# Patient Record
Sex: FEMALE | Race: WHITE | URBAN - METROPOLITAN AREA
[De-identification: names, ages, dates, MRNs, and addresses within clinical notes are randomized per-mention and may not be internally consistent; named-entity substitution may affect disease eponyms.]

---

## 2023-05-09 ENCOUNTER — OFFICE VISIT (OUTPATIENT)
Dept: FAMILY MEDICINE CLINIC | Facility: CLINIC | Age: 33
End: 2023-05-09
Payer: COMMERCIAL

## 2023-05-09 VITALS
RESPIRATION RATE: 16 BRPM | SYSTOLIC BLOOD PRESSURE: 128 MMHG | HEART RATE: 86 BPM | BODY MASS INDEX: 25.3 KG/M2 | OXYGEN SATURATION: 98 % | HEIGHT: 63 IN | WEIGHT: 142.81 LBS | DIASTOLIC BLOOD PRESSURE: 68 MMHG

## 2023-05-09 DIAGNOSIS — Z00.00 WELL WOMAN EXAM WITHOUT GYNECOLOGICAL EXAM: Primary | ICD-10-CM

## 2023-05-09 PROCEDURE — 3008F BODY MASS INDEX DOCD: CPT | Performed by: FAMILY MEDICINE

## 2023-05-09 PROCEDURE — 3078F DIAST BP <80 MM HG: CPT | Performed by: FAMILY MEDICINE

## 2023-05-09 PROCEDURE — 99385 PREV VISIT NEW AGE 18-39: CPT | Performed by: FAMILY MEDICINE

## 2023-05-09 PROCEDURE — 3074F SYST BP LT 130 MM HG: CPT | Performed by: FAMILY MEDICINE

## 2023-09-21 LAB
AMB EXT CHOL/HDL RATIO: 2.6
AMB EXT CHOLESTEROL, TOTAL: 142 MG/DL
AMB EXT GLUCOSE: 86 MG/DL
AMB EXT HDL CHOLESTEROL: 55 MG/DL
AMB EXT HEMATOCRIT: 41.1
AMB EXT HEMOGLOBIN: 13.4
AMB EXT LDL CHOLESTEROL, DIRECT: 74 MG/DL
AMB EXT MCV: 93
AMB EXT PLATELETS: 235
AMB EXT TRIGLYCERIDES: 62 MG/DL
AMB EXT WBC: 7.1 X10(3)UL

## 2024-04-22 ENCOUNTER — TELEPHONE (OUTPATIENT)
Dept: FAMILY MEDICINE CLINIC | Facility: CLINIC | Age: 34
End: 2024-04-22

## 2024-04-22 DIAGNOSIS — Z00.00 ROUTINE HEALTH MAINTENANCE: Primary | ICD-10-CM

## 2024-04-22 NOTE — TELEPHONE ENCOUNTER
Please enter lab orders for the patient's upcoming physical appointment.     Physical scheduled:   Your appointments       Date & Time Appointment Department ([place])    May 14, 2024 3:00 PM CDT Physical - Established with Gisell Harrington DO [facility], [facility] ([facility])              [facility]  [address] [place] [address] [place] [id number]  [phone]           Preferred lab: [facility] ([facility])     The patient has been notified to complete fasting labs prior to their physical appointment.

## 2024-07-05 ENCOUNTER — OFFICE VISIT (OUTPATIENT)
Dept: FAMILY MEDICINE CLINIC | Facility: CLINIC | Age: 34
End: 2024-07-05
Payer: COMMERCIAL

## 2024-07-05 VITALS
OXYGEN SATURATION: 100 % | HEIGHT: 62.7 IN | DIASTOLIC BLOOD PRESSURE: 74 MMHG | TEMPERATURE: 98 F | SYSTOLIC BLOOD PRESSURE: 112 MMHG | BODY MASS INDEX: 26.74 KG/M2 | RESPIRATION RATE: 16 BRPM | WEIGHT: 149 LBS | HEART RATE: 70 BPM

## 2024-07-05 DIAGNOSIS — Z00.00 WELL WOMAN EXAM WITHOUT GYNECOLOGICAL EXAM: Primary | ICD-10-CM

## 2024-07-05 DIAGNOSIS — H91.93 HEARING DIFFICULTY OF BOTH EARS: ICD-10-CM

## 2024-07-05 NOTE — PROGRESS NOTES
SUBJECTIVE:  Chief Complaint   Patient presents with    Physical     WWE no pap    Hearing Problem     X3-4 years     HPI:  Hearing problem: Sleeps with ear plugs every night which helps her fall asleep. Notes that she reads lips when they are talking so struggled during covid with everyone wearing masks. Now noticing that she is mis-hearing a lot. Unsure if this is related to focusing as well.     Stopped IUD in March. May want to conceive in the near future.     Health Maintenance:  Vaccines: reviewed as below. Indicated today: none (Had Tdap in [facility] 2022)   Obesity screening: Body mass index is 26.65 kg/m².  Diabetes screening:  due  Hypercholesterolemia screening: due  Depression screen:   Depression Screening (PHQ-2/PHQ-9): Over the LAST 2 WEEKS   Little interest or pleasure in doing things: Not at all  Feeling down, depressed, or hopeless: Not at all  PHQ-2 SCORE: 0    Osteoporosis: No history of pathologic fractures. No steroid use, smoking, risk of falls, excessive alcohol use.  Cervical Cancer screening: Last Pap: 3/2023    Colon Cancer screening: Family history of colon cancer? no   Breast Cancer screening: Family history of breast cancer? Yes- maternal side- grandmother  STI: Desires testing for STIs? no  Tobacco use:  reports that she has never smoked. She has never used smokeless tobacco.    ROS: Negative unless stated above    HISTORY:  History reviewed. No pertinent past medical history.   History reviewed. No pertinent surgical history.   Family History   Problem Relation Age of Onset    High Blood Pressure Mother     Asthma Father     Breast Cancer Maternal Grandmother       Social History     Socioeconomic History    Marital status:    Occupational History    Occupation: Teacher   Tobacco Use    Smoking status: Never    Smokeless tobacco: Never   Vaping Use    Vaping status: Never Used   Substance and Sexual Activity    Alcohol use: Yes     Comment: 2 drinks a week    Drug use: Never     Sexual activity: Yes     Partners: Male   Other Topics Concern    Caffeine Concern Yes     Comment: 1 cup of coffee daily    Exercise Yes     Comment: 3x a week    Self-Exams Yes        Allergies:  Allergies   Allergen Reactions    Guaifenesin SWELLING    Phenylephrine SWELLING    Sudafed ITCHING, RASH and SWELLING       OBJECTIVE:  PHYSICAL EXAM:  Vitals:    07/05/24 0845   BP: 112/74   Pulse: 70   Resp: 16   Temp: 97.9 °F (36.6 °C)   SpO2: 100%   Weight: 149 lb (67.6 kg)   Height: 5' 2.7\" (1.593 m)     Physical Examination: General appearance - alert, well appearing, and in no distress and normal appearing weight  Mental status - alert, oriented to person, place, and time, normal mood, behavior, speech, dress, motor activity, and thought processes  Ears - bilateral TM's and external ear canals normal  Mouth - mucous membranes moist, pharynx normal without lesions  Neck - supple, no significant adenopathy  Chest - clear to auscultation, no wheezes, rales or rhonchi, symmetric air entry  Heart - normal rate, regular rhythm, normal S1, S2, no murmurs, rubs, clicks or gallops  Abdomen - soft, nontender, nondistended, no masses or organomegaly  Extremities - peripheral pulses normal, no pedal edema, no clubbing or cyanosis    ASSESSMENT & PLAN:  Camila Stratton is a 34 year old female is here for Physical (WWE no pap) and Hearing Problem (X3-4 years)    Problem List Items Addressed This Visit    None  Visit Diagnoses       Well woman exam without gynecological exam    -  Primary    Hearing difficulty of both ears        Relevant Orders    ENT Referral - In Network          Camila was seen today for physical and hearing problem.    Diagnoses and all orders for this visit:    Well woman exam without gynecological exam  Routine health maintenance reviewed, discussed and updated as below. This includes: labs. Healthy diet and exercise reviewed.     Hearing difficulty of both ears  -   Normal ear exam today. Recommend formal  hearing testing and exam.  ENT Referral - In Network    Call or return to clinic prn if these symptoms worsen or fail to improve as anticipated. RTC in 1 year.   Gisell Harrington DO  7/5/2024 9:08 AM    Note to Patient  The 21st Century Cures Act makes medical notes like these available to patients in the interest of transparency. However, be advised this is a medical document and is intended as hysa-tb-dpsk communication; it is written in medical language and may appear blunt, direct, or contain abbreviations or verbiage that are unfamiliar. Medical documents are intended to carry relevant information, facts as evident, and the clinical opinion of the practitioner.     This report has been produced using speech recognition software, and may contain errors related to grammar, punctuation, spelling, words or phrases unrecognized or not translated appropriately to text; these errors may be referred to the dictating provider for further clarification and/or addendum as needed.

## 2025-03-24 ENCOUNTER — PATIENT MESSAGE (OUTPATIENT)
Dept: FAMILY MEDICINE CLINIC | Facility: CLINIC | Age: 35
End: 2025-03-24

## 2025-03-24 DIAGNOSIS — Z00.00 ROUTINE HEALTH MAINTENANCE: Primary | ICD-10-CM

## 2025-03-31 DIAGNOSIS — E04.9 ENLARGED THYROID: Primary | ICD-10-CM

## 2025-04-03 NOTE — TELEPHONE ENCOUNTER
Spoke with Camila giving her 's comments and recommendations. Camila has an appointment with  4/8/2025. She will have her labs done tomorrow. Regarding weight loss, she lost 14lbs without trying from December to March. She has diarrhea all the time. She feels hot and sweaty all the time. All these symptoms have been present the past 6 months. For the past 3 months her legs feel shaky and unsteady going down stairs and her R leg feels shaky when extended driving.     For your review  routed to

## 2025-04-03 NOTE — TELEPHONE ENCOUNTER
Spoke with Camila who was dx with Hypothyroidism by Gyne this morning. They are trying to conceive. She was given a referral to  and has an appointment 6/02/2025. She is asking what is the next step. She has an annual physical scheduled with  5/29/2025. Labs are ordered and Thyroid testing is included.  I told her we are asking labs be done no more than 30 days before the appointment. Camila verbalized understanding. Mine is asking if a video visit could be done sooner regarding her new diagnosis or could she wait to discuss at her 5/29 appointment with     Please advise   routed to

## 2025-04-03 NOTE — TELEPHONE ENCOUNTER
GYN labs reviewed and consistent with hyperthyroidism which is concerning for an acute disease like Graves' disease.  We would not want this to wait.  I am going to put in a few additional labs for her to obtain and let's get her an appointment ASAP.  Lab orders are in.  Can you also ask if she is having any palpitations or racing heart, weight loss, vision changes, diarrhea, anxiety?    Gisell Harrington, DO

## 2025-04-03 NOTE — TELEPHONE ENCOUNTER
Pts calling because her OBGYN diagnosed her with hypothyroidism and she wants to know what her next steps should be

## 2025-04-08 NOTE — PROGRESS NOTES
The following individual(s) verbally consented to be recorded using ambient AI listening technology and understand that they can each withdraw their consent to this listening technology at any point by asking the clinician to turn off or pause the recording:    Patient name: Camila Dwyerrio

## 2025-04-08 NOTE — TELEPHONE ENCOUNTER
would like Camila to see Endocrinology as soon as possible due to possible Graves Disease. A Referral was placed to . Spoke with Demetrio at 's office, telling her Camila is not on any medication to manage her Thyroid symptoms. She will call Camila to try to coordinate an appointment. Notified  that 's office will contact Camila to make an appointment.

## 2025-04-08 NOTE — PROGRESS NOTES
Chief Complaint:  Chief Complaint   Patient presents with    Lab Results     Thyroid testing 4/4/2025 and blood testing.       HPI:  This is a 34 year old female patient presenting for Lab Results (Thyroid testing 4/4/2025 and blood testing.)    History of Present Illness  Camila Wiley is a 34 year old female with hyperthyroidism who presents with symptoms of elevated heart rate and tremors.    She has been experiencing symptoms consistent with hyperthyroidism for several months, with some symptoms becoming more noticeable in the last three months. She has an elevated heart rate, which she monitors using a watch that notifies her when her heart rate exceeds 100 bpm. Initialylwas seen by OBGYN and noted to have abnoraml thyroid labs. These were reviewed by me and indicated hyperthyroidism. We discussed over the phone and I sent in atenolol 25mg once daily and recommended updating labs. Since starting atenolol, her heart rate is generally in the 85-90 bpm range during the day but increases in the afternoon and evening, reaching over 100 bpm while at rest.    She experiences tremors, persistent diarrhea, and weight loss. She has been taking atenolol, initially once daily, which has helped maintain her heart rate during the day but not in the evening. There has been no improvement in tremors or weakness with the medication. She has persistent diarrhea and weight loss, which have not improved.    Laboratory tests showed TSH levels below normal, elevated free T4 at 4.1, total T3 at 3.39, thyroid stimulating immunoglobulin at 6.24, antithyroglobulin at 1066, and antithyroproxase at 4014. These findings suggest Graves' disease, although there is also a presence of antibodies typically associated with Hashimoto's disease.    She has been off hormonal birth control for a year and questions whether this is related to her current symptoms, but it is not believed to be connected. She is planning to become pregnant and is  concerned about the implications of her condition on pregnancy.    No eye-related issues.    Health Maintenance:  Health Maintenance   Topic Date Due    DTaP,Tdap,and Td Vaccines (1 - Tdap) Never done    COVID-19 Vaccine (1 - 2024-25 season) Never done    Annual Depression Screening  01/01/2025    Annual Physical  07/05/2025    Influenza Vaccine (Season Ended) 10/01/2025    Pap Smear  03/28/2026    Pneumococcal Vaccine: Birth to 50yrs  Aged Out    Meningococcal B Vaccine  Aged Out       ROS: Review of systems performed and negative unless stated in HPI.    Past medical, family and social history reviewed and listed as below.    HISTORY:  No past medical history on file.   No past surgical history on file.   Family History   Problem Relation Age of Onset    High Blood Pressure Mother     Asthma Father     Breast Cancer Maternal Grandmother       Social History     Socioeconomic History    Marital status:    Occupational History    Occupation: Teacher   Tobacco Use    Smoking status: Never    Smokeless tobacco: Never   Vaping Use    Vaping status: Never Used   Substance and Sexual Activity    Alcohol use: Yes     Comment: 2 drinks a week    Drug use: Never    Sexual activity: Yes     Partners: Male   Other Topics Concern    Caffeine Concern Yes     Comment: 1 cup of coffee daily    Exercise Yes     Comment: 3x a week    Self-Exams Yes        Current Outpatient Medications   Medication Sig Dispense Refill    atenolol 25 MG Oral Tab Take 1 tablet (25 mg total) by mouth 2 (two) times daily. 60 tablet 0     Allergies:  Allergies[1]    EXAM:  Vitals:    04/08/25 1213   BP: 124/64   BP Location: Left arm   Pulse: 76   Resp: 16   Temp: 97.4 °F (36.3 °C)   TempSrc: Oral   SpO2: 98%   Weight: 140 lb 6.4 oz (63.7 kg)     GENERAL: vitals reviewed and listed above, alert, oriented, appears well hydrated and in no acute distress  HEENT: atraumatic, conjunctiva clear, no obvious abnormalities on inspection   NECK: goiter  present without tenderness  LUNGS: clear to auscultation bilaterally, no wheezes, rales or rhonchi, good air movement  CV: HRRR, no murmurs, no peripheral edema   EXTREMITIES: warm and well perfused  PSYCH: pleasant and cooperative, no obvious depression or anxiety    ASSESSMENT AND PLAN:  Discussed the following assessment   1. Hyperthyroidism  -     Atenolol; Take 1 tablet (25 mg total) by mouth 2 (two) times daily.  Dispense: 60 tablet; Refill: 0      Advised the following:  Assessment & Plan  Graves' Disease  She presents with symptoms of overt hyperthyroidism, including elevated heart rate, tremors, and weight loss. Lab results confirm Graves' disease with elevated thyroid stimulating immunoglobulin, free T4, and total T3. Positive antithyroglobulin and antithyroperoxidase antibodies suggest possible concurrent autoimmune thyroiditis. Her plans for pregnancy influence treatment options due to potential risks associated with therapies like radioactive iodine.  - Increase atenolol to twice daily to manage tachycardia and tremors.  - Refer to endocrinology for expedited management and treatment planning, considering her pregnancy plans.  - Discuss with endocrinology the implications of methimazole or PTU versus radioactive iodine on future pregnancy.    Tachycardia  She experiences elevated heart rate, particularly in the evening, with current atenolol regimen. Resting heart rate exceeds 100 bpm, indicating inadequate control.  - Adjust atenolol dosing to twice daily for consistent heart rate control.  - Consider switching to propranolol if atenolol adjustment is insufficient.    Follow-up  Prompt follow-up with endocrinology is necessary for managing Graves' disease and its implications for pregnancy. Coordination with her gynecologist is essential for comprehensive care.  - Coordinate with endocrinology for an appointment   - Ensure communication between her gynecologist and endocrinologist for coordinated care  regarding pregnancy planning.      Gisell Harrington DO  4/8/2025 1:14 PM  Family Medicine    Note to Patient  The 21st Century Cures Act makes medical notes like these available to patients in the interest of transparency. However, be advised this is a medical document and is intended as ctay-se-ccod communication; it is written in medical language and may appear blunt, direct, or contain abbreviations or verbiage that are unfamiliar. Medical documents are intended to carry relevant information, facts as evident, and the clinical opinion of the practitioner.     This report has been produced using speech recognition software, and may contain errors related to grammar, punctuation, spelling, words or phrases unrecognized or not translated appropriately to text; these errors may be referred to the dictating provider for further clarification and/or addendum as needed.         [1]   Allergies  Allergen Reactions    Guaifenesin SWELLING    Phenylephrine SWELLING    Sudafed ITCHING, RASH and SWELLING

## 2025-04-18 NOTE — PATIENT INSTRUCTIONS
Visit Summary  Stop atenolol and start Propranolol 10 mg three times a day  Start Methimazole 20 mg daily  Repeat labs in 4 weeks or sooner if symptomatic  I will be in touch at that time     General follow up information:  Please let us know if you require any refills at least 1 week prior to your medication running out. If you do run out of medication, please call our office ASAP to request refills (do not wait until your follow up).  Please call us if you experience any problems with insurance coverage of medication, lab work, or imaging.   Lab results and imaging will typically be reviewed at follow up appointments, or within 3-5 business days of ALL results being in if you do not have an appointment scheduled in the near future. Our office will contact you for any abnormal results requiring more urgent follow up or action.  The on-call pager is for urgent matters only. If you are a type 1 diabetic and run out of insulin after business hours 8AM-4PM, you may call the on-call pager for a refill to a 24 hour pharmacy. If you have adrenal insufficiency and run out of steroids, you may call the on-call pager for a refill to a 24 hours pharmacy. All other refill requests should be requested during business hours.    Return Visit   [x] Dr. Carlin in 3 months   [x] Virtual visit  [] No follow up appointment needed  [] Follow up to be scheduled pending      []  Fasting/8AM labs  []  Central scheduling # for ultrasound/nuclear med/CT/MRI/DXA/IR  []  Provide flyer for:  [] ENT   [] Weight Management  [] Infertility/Reproductive Endocrinology  [] Transgender care  []  Directions to 1st floor lab  [] Collect urine collection jug  [] Collect salivary cortisol tubes  []  Dental clearance form  []  Will need authorization for outside records

## 2025-04-18 NOTE — H&P
[facility] [place]    Name: Camila Wiley    Date: 4/18/2025    Camila Wiley is a 34 year old female who presents for evaluation of thyrotoxicosis.     Chief complaint: Ultrasound (Thyroid ultrasound 4/2025) and Graves (Establish care, treatment plan )       Subjective:    Initial HPI consult - 4/18/2025  She had her annual gyene appointment around 2 weeks ago and was told she has an enlarged thyroid. She underwent blood work and thyroid US. She was told she has hyperthyroidism, she followed with her PCP who ordered antibodies and was told she has graves disease.  She started atenolol around 2 weeks ago. Initially daily and now BID, around 1 week ago.  She does note a hx of goiter as a child and has had annual work up with unremarkable TFTs until recently.  Stopped hormonal birth control around 1 year ago.     Thyroid Hx:   -Family history- denies    [] History of head/neck radiation   [] Recent illness or contrast exposure  [] Biotin Use  [] Amiodarone Use  [] Tobacco Use Hx      She endorses excessive sweating, light menses, fatigue, irritibaility, trouble slepeing, loose stools, tachycardia/pallpitations, weight loss over the last 3-6 months       History/Other:    Allergies, PMH, SocHx and FHx reviewed and updated as appropriate in Epic on Current Medications[1]  Allergies[2]  Current Medications[3]  Past Medical History[4]  Family History[5]  Social history: Reviewed.      ROS/Exam    REVIEW OF SYSTEMS: Ten point review of systems has been performed and is otherwise negative and/or non-contributory, except as described above.     VITALS  Vitals:    04/18/25 1101   BP: 114/78   Pulse: 74   SpO2: 97%   Weight: 137 lb (62.1 kg)   Height: 5' 2\" (1.575 m)       Wt Readings from Last 6 Encounters:   04/18/25 137 lb (62.1 kg)   04/08/25 140 lb 6.4 oz (63.7 kg)   07/05/24 149 lb (67.6 kg)   05/09/23 142 lb 12.8 oz (64.8 kg)       PHYSICAL EXAM  CONSTITUTIONAL:  awake, alert, cooperative, no  apparent distress, and appears stated age    PSYCH: normal affect  LUNGS: breathing comfortably  CARDIOVASCULAR:  regular rate   NECK: thyromegaly    Labs/Imaging: Pertinent imaging reviewed.    Assessment & Plan:     ICD-10-CM    1. Graves disease  E05.00 Thyrotropin Receptor Ab, Serum     TSH and Free T4 [E]     Triiodothyronine (T3) Total     methIMAzole 10 MG Oral Tab     DISCONTINUED: propranolol 10 MG Oral Tab          Camila Wiley is a pleasant 34 year old female here for evaluation of:    #Hyperthyroidism  Lab Results   Component Value Date    TSH <0.010 (L) 04/04/2025    T4F 4.1 (H) 04/04/2025      PMHx of Hyperthyroidism diagnosed recently with positive ab c/w Graves disease. Started on atenolol BID.    - Pathophysiology of condition, goals of therapy,  d/w pt in detail.   - clinical significance of thyroid and antibody testing discussed   -Patient endorses symptoms of hyperthyroidism   -Recent TFTs with TSH suppressed and FT4 above range   -4/2025 Thyroid US without any discrete nodules     - thyroid is non-nodular on exam  and no opthalmopathy on exam (would need to refer to ophtho and also CI for RAIA)  - pt is not pregnant, breastfeeding or planning for pregnancy in the near future  - uli start on MMI 20mg daily  - counseled on side effects, including but not limited to rash and very rarely agranulocytosis. Counseled to call clinic so we may order CBC     Plan:  - med changes: start MMI 10 mg BID and Propranolol 10 mg TID   - lab orders placed for 4 weeks and will discuss next steps once all labs result      Return in about 3 months (around 7/18/2025).    The above plan was discussed in detail with the patient who verbalized understanding and agreement.     Neda Carlin DO  [facility]  4/18/2025     In reviewing this note, please be advised that Dragon Voice Recognition software used to dictate the note may have made errors in recognizing some of the words or phrases.     Note to  patient: The 21 Century Cures Act makes medical notes like these available to patients in the interest of transparency. However, be advised this is a medical document. It is intended as peer to peer communication. It is written in medical language and may contain abbreviations or verbiage that are unfamiliar. It may appear blunt or direct. Medical documents are intended to carry relevant information, facts as evident, and the clinical opinion of the practitioner.            [1]    [DISCONTINUED] propranolol 10 MG Oral Tab Take 1 tablet (10 mg total) by mouth 3 (three) times daily. 180 tablet 0    methIMAzole 10 MG Oral Tab Take 1 tablet (10 mg total) by mouth in the morning and 1 tablet (10 mg total) before bedtime. 180 tablet 0   [2]   Allergies  Allergen Reactions    Guaifenesin SWELLING    Phenylephrine SWELLING    Sudafed ITCHING, RASH and SWELLING   [3]   Current Outpatient Medications   Medication Sig Dispense Refill    methIMAzole 10 MG Oral Tab Take 1 tablet (10 mg total) by mouth in the morning and 1 tablet (10 mg total) before bedtime. 180 tablet 0    propranolol 10 MG Oral Tab Take 1 tablet (10 mg total) by mouth 3 (three) times daily. 270 tablet 1   [4] History reviewed. No pertinent past medical history.  [5]   Family History  Problem Relation Age of Onset    High Blood Pressure Mother     Asthma Father     Breast Cancer Maternal Grandmother

## 2025-05-14 NOTE — TELEPHONE ENCOUNTER
Endocrine Refill protocol for oral antihypertensive medications    Protocol Criteria:  PASSED  Reason: N/A     If all below requirements are met, send a 90-day supply with 1 refill per provider protocol.    Verify appointment with Endocrinology completed in the last 6 months or scheduled in the next 3 months.  Verify BMP or CMP completed in the last 12 months   Verify last GFR result is greater than or equal to 50     Last completed office visit:4/18/2025 Neda Carlin DO   Last completed telemed visit: Visit date not found  Next scheduled Follow up:   Future Appointments   Date Time Provider Department Center   5/16/2025  9:00 AM REF [facility] REF [facility] ED[facility] Ref [facility]   5/29/2025  9:00 AM Gisell Harrington DO [facility] [place]   7/18/2025  8:00 AM Neda Carlin DO [id number] [facility] [place]      Last BMP or CMP completion date:  Lab Results   Component Value Date    EGFRCR 122 04/04/2025

## 2025-05-20 NOTE — TELEPHONE ENCOUNTER
Reviewed labs and free T4 has improved significantly and total T3 is also within range.  Please have her continue propranolol 10 mg 3 times daily and reduce methimazole to 10 mg in the morning and 5 mg in the evening.  Please have her repeat labs in 4 to 6 weeks prior to her 7/18/2025 visit with me.  Lab orders placed.  Thank you

## 2025-05-20 NOTE — TELEPHONE ENCOUNTER
Reviewed below with understanding. My chart message sent to patient to clarify.       Closing encounter.

## 2025-05-29 NOTE — PROGRESS NOTES
SUBJECTIVE:  Chief Complaint   Patient presents with    Physical     WWE     HPI:  History of Present Illness  Camila Wiley is a 35 year old female with Graves' disease who presents for an annual physical exam.    She was recently diagnosed with Graves' disease and is under the care of an endocrinologist. She is taking methimazole and propranolol three times a day, which has led to significant improvement in her symptoms, including weight gain and better heart rate management. Her energy levels have improved, and she is gradually resuming exercise. She has noticed improvement in mood and heat intolerance, with a significant difference after starting treatment. However, she is experiencing some gastrointestinal symptoms, described as 'a little loose'.    She is planning to travel to [place] and is curious about the potential impact of elevation on her medications. Regarding family planning, she is aware that she might need to switch medications during pregnancy and that her condition could potentially go into remission, allowing for a period without medication. She is currently in a 'data gathering phase' with regular blood work every four weeks to monitor her thyroid levels.    Her family history includes a grandmother with breast cancer, but no history of colon cancer. She had an irregular Pap smear a few years ago and continues to have regular screenings.    Graves disease: On methimazole and propranolol. Following with endocrinology.     Health Maintenance:  Vaccines: reviewed as below. Indicated today: Tdap- March 2022    There is no immunization history on file for this patient.  Obesity screening: Body mass index is 25.78 kg/m².  Diabetes screening:    Lab Results   Component Value Date     04/04/2025    A1C 5.1 04/04/2025      Hypercholesterolemia screening:   Lab Results   Component Value Date    HDL 45 04/04/2025    HDL 55 09/21/2023     Lab Results   Component Value Date    LDL 76  04/04/2025     Lab Results   Component Value Date    TRIG 138 04/04/2025    TRIG 62 09/21/2023        Depression screen: no concerns  Osteoporosis: No history of pathologic fractures. No steroid use, smoking, risk of falls, excessive alcohol use.  Cervical Cancer screening: Last Pap: 03/28/2023     Colon Cancer screening: Family history of colon cancer? no Last colonoscopy: -   Breast Cancer screening: Family history of breast cancer? Grandmother; Last mammogram: -   STI: Desires testing for STIs? no  Tobacco use:  reports that she has never smoked. She has never used smokeless tobacco.    ROS: Negative unless stated above    HISTORY:  Past Medical History[1]   Past Surgical History[2]   Family History[3]   Short Social Hx on File[4]     Allergies:  Allergies[5]    OBJECTIVE:  PHYSICAL EXAM:  Vitals:    05/29/25 0855   BP: 106/70   Pulse: 56   Resp: 16   SpO2: 98%   Weight: 144 lb 6.4 oz (65.5 kg)   Height: 5' 2.76\" (1.594 m)     Physical Examination: General appearance - alert, well appearing, and in no distress and normal appearing weight  Mental status - alert, oriented to person, place, and time, normal mood, behavior, speech, dress, motor activity, and thought processes  Ears - bilateral TM's and external ear canals normal  Neck - supple, no significant adenopathy, diffusely enlarged thyroid  Chest - clear to auscultation, no wheezes, rales or rhonchi, symmetric air entry  Heart - normal rate, regular rhythm, normal S1, S2, no murmurs, rubs, clicks or gallops  Abdomen - soft, nontender, nondistended, no masses or organomegaly  Extremities - peripheral pulses normal, no pedal edema, no clubbing or cyanosis    Results      ASSESSMENT & PLAN:  Camila Wiley is a 35 year old female is here for Physical (WWE)      1. Well woman exam without gynecological exam (Primary)  2. Graves disease  Assessment & Plan  Graves' disease  Graves' disease is managed with methimazole and propranolol, resulting in significant  symptom improvement, including weight gain, improved energy levels, and reduced heat intolerance. Recent lab results prompted an adjustment in methimazole dosage by the endocrinologist. She plans for pregnancy and has been advised to stabilize thyroid levels for 3-6 months prior to conception. Medication changes may be necessary during pregnancy due to hormonal changes, and she prefers conservative management over radioactive iodine treatment to avoid complications and delay in pregnancy.  - Continue methimazole and propranolol as prescribed.  - Follow up with endocrinologist in July for evaluation and lab work.  - Monitor thyroid function and adjust medications as needed.  - Discuss pregnancy planning and medication adjustments with endocrinologist.    Tachycardia 2/2 hyperthyroidism  Tachycardia is managed with propranolol, effectively maintaining a healthy heart rate and improving symptoms.  - Continue propranolol as prescribed.  - Monitor heart rate and report any significant changes or symptoms.    WWE exam  -UTD on screenings    Recording duration: 16 minutes    Call or return to clinic prn if these symptoms worsen or fail to improve as anticipated. RTC in 1 year.   Gisell Harrington DO  5/29/2025 9:11 AM    Note to Patient  The 21st Century Cures Act makes medical notes like these available to patients in the interest of transparency. However, be advised this is a medical document and is intended as [id number] communication; it is written in medical language and may appear blunt, direct, or contain abbreviations or verbiage that are unfamiliar. Medical documents are intended to carry relevant information, facts as evident, and the clinical opinion of the practitioner.     This report has been produced using speech recognition software and AI generated text, and may contain errors related to grammar, punctuation, spelling, words or phrases unrecognized or not translated appropriately to text; these errors may  be referred to the dictating provider for further clarification and/or addendum as needed.       [1] No past medical history on file.  [2] No past surgical history on file.  [3]   Family History  Problem Relation Age of Onset    High Blood Pressure Mother     Asthma Father     Breast Cancer Maternal Grandmother    [4]   Social History  Socioeconomic History    Marital status:    Occupational History    Occupation: Teacher   Tobacco Use    Smoking status: Never    Smokeless tobacco: Never   Vaping Use    Vaping status: Never Used   Substance and Sexual Activity    Alcohol use: Yes     Comment: 2 drinks a week    Drug use: Never    Sexual activity: Yes     Partners: Male   Other Topics Concern    Caffeine Concern Yes     Comment: 1 cup of coffee daily    Exercise Yes     Comment: 3x a week    Self-Exams Yes     Social Drivers of Health     Food Insecurity: No Food Insecurity (5/29/2025)    NCSS - Food Insecurity     Worried About Running Out of Food in the Last Year: No     Ran Out of Food in the Last Year: No   Transportation Needs: No Transportation Needs (5/29/2025)    NCSS - Transportation     Lack of Transportation: No   Housing Stability: Not At Risk (5/29/2025)    NCSS - Housing/Utilities     Has Housing: Yes     Worried About Losing Housing: No     Unable to Get Utilities: No   [5]   Allergies  Allergen Reactions    Guaifenesin SWELLING    Phenylephrine SWELLING    Sudafed ITCHING, RASH and SWELLING

## 2025-05-29 NOTE — PROGRESS NOTES
The following individual(s) verbally consented to be recorded using ambient AI listening technology and understand that they can each withdraw their consent to this listening technology at any point by asking the clinician to turn off or pause the recording:    Patient name: Camila Dweyrrio

## 2025-07-02 NOTE — TELEPHONE ENCOUNTER
Most recent dose change: Methimazole 10 mg daily and you can stop your propranolol.     Prior to this, patient was taking Methimazole 10 mg in the am and 5 mg in the PM along with Propranolol 3 times daily.

## 2025-07-02 NOTE — TELEPHONE ENCOUNTER
Hi - hard to say to what degree these symptoms are from changing her medication as none of them are really classic for methimazole. Joint pain would be unusual with a decrease in medication. Discontinuing propranolol typically does not cause any \"rebound\" symptoms. Sometimes thyroid levels shifting rapidly in the blood can cause unpredictable reactions, but I'm sorry that I'm not able to confirm if any of these current issues are due to the medication changes - I would say she can repeat her lab work as early as 3 weeks post-medication change (7/8 or later) and Dr. STEIN can review appropriate changes at that point. She should stay well hydrated and it wouldn't be wrong to run these sx by her PCP in the event there are multiple things going on at once.

## 2025-07-18 NOTE — TELEPHONE ENCOUNTER
Had called patient to reschedule  appointment .  had changed medication to 7.5 patient was wondering if there was a pill form in 7.5 . If so she would like a rx so she would not have to cut in half.

## 2025-07-23 NOTE — TELEPHONE ENCOUNTER
Endocrine refill protocol for medications for hypothyroidism and hyperthyroidism    Protocol Criteria:  FAILED Reason: N/A    If all below requirements are met, send a 90-day supply with 1 refill per provider protocol.    Verify appointment with Endocrinology completed in the last 12 months or scheduled in the next 6 months.    Normal TSH result in the past 12 months   Review recent telephone encounters and mychart communications with patient to ensure a dose change has not occurred since last office visit that was not updated in the medication history list     Last completed office visit:Visit date not found   Last completed telemed visit: Visit date not found  Next scheduled Follow up:   Future Appointments   Date Time Provider Department Center   7/29/2025  9:00 AM Neda Carlin DO [id number]      Last TSH result:   TSH   Date Value Ref Range Status   07/14/2025 12.976 (H) 0.550 - 4.780 uIU/mL Final